# Patient Record
Sex: FEMALE | Race: WHITE | HISPANIC OR LATINO | URBAN - METROPOLITAN AREA
[De-identification: names, ages, dates, MRNs, and addresses within clinical notes are randomized per-mention and may not be internally consistent; named-entity substitution may affect disease eponyms.]

---

## 2020-01-20 ENCOUNTER — APPOINTMENT (RX ONLY)
Dept: URBAN - METROPOLITAN AREA CLINIC 349 | Facility: CLINIC | Age: 50
Setting detail: DERMATOLOGY
End: 2020-01-20

## 2020-01-20 DIAGNOSIS — L30.9 DERMATITIS, UNSPECIFIED: ICD-10-CM

## 2020-01-20 DIAGNOSIS — D485 NEOPLASM OF UNCERTAIN BEHAVIOR OF SKIN: ICD-10-CM

## 2020-01-20 PROBLEM — D48.5 NEOPLASM OF UNCERTAIN BEHAVIOR OF SKIN: Status: ACTIVE | Noted: 2020-01-20

## 2020-01-20 PROCEDURE — ? OTHER

## 2020-01-20 PROCEDURE — ? COUNSELING

## 2020-01-20 PROCEDURE — ? REFERRAL

## 2020-01-20 PROCEDURE — 99202 OFFICE O/P NEW SF 15 MIN: CPT

## 2020-01-20 ASSESSMENT — LOCATION SIMPLE DESCRIPTION DERM
LOCATION SIMPLE: CHEST
LOCATION SIMPLE: LEFT EYELID

## 2020-01-20 ASSESSMENT — LOCATION DETAILED DESCRIPTION DERM
LOCATION DETAILED: LEFT LATERAL CANTHUS
LOCATION DETAILED: MIDDLE STERNUM

## 2020-01-20 ASSESSMENT — LOCATION ZONE DERM
LOCATION ZONE: TRUNK
LOCATION ZONE: EYELID

## 2020-01-20 NOTE — HPI: RASH
How Severe Is Your Rash?: mild
Is This A New Presentation, Or A Follow-Up?: Rash
Additional History: Patient stated that she will get a blistering itchy rash on her chest right before summer. Patient stated that rash will appear and then last a few weeks and then go away. Patient stated this first started about five years ago.

## 2020-01-20 NOTE — HPI: SKIN LESION
How Severe Is Your Skin Lesion?: mild
Has Your Skin Lesion Been Treated?: not been treated
Is This A New Presentation, Or A Follow-Up?: Growth
Additional History: Patient has a lesion on the left eyelid. Patient stated it has been present for several years and has gotten larger over the years. Patient denies any bleeding or pain in the area.

## 2020-01-20 NOTE — PROCEDURE: OTHER
Other (Free Text): Lesion has been present to for over 15 years and has gotten larger over the years. Patient stated that lesion has been growing towards her eye and she can see it out of the corner of her eye. \\n\\Zane Ocampo came into office and examined lesion. Dr. Ocampo stated that she didn’t feel that it was anything concerning. They discussed draining lesion but mentioned that it would possibly fill back up. \\n\\nDiscussed referring patient to [facility] specialist for removal. Other (Free Text): Lesion has been present to for over 15 years and has gotten larger over the years. Patient stated that lesion has been growing towards her eye and she can see it out of the corner of her eye. \\n\\Zane Ocampo came into office and examined lesion. Dr. Ocampo stated that she didn’t feel that it was anything concerning. They discussed draining lesion but mentioned that it would possibly fill back up. \\n\\nDiscussed referring patient to [facility] specialist for removal.

## 2020-01-20 NOTE — PROCEDURE: OTHER
Other (Free Text): Patient stated that she will get a blistering itchy rash on her chest right before summer. Patient stated that rash will appear and then last a few weeks and then go away. Patient stated this first started about five years ago. \\n\\nRash not present today. Patient advised to contact office when rash flares up, we will bring her in and biopsy rash when it is flared.

## 2024-08-09 ENCOUNTER — APPOINTMENT (OUTPATIENT)
Dept: GENERAL RADIOLOGY | Age: 54
End: 2024-08-09
Payer: COMMERCIAL

## 2024-08-09 ENCOUNTER — HOSPITAL ENCOUNTER (EMERGENCY)
Age: 54
Discharge: HOME | End: 2024-08-09
Attending: EMERGENCY MEDICINE
Payer: COMMERCIAL

## 2024-08-09 VITALS
BODY MASS INDEX: 30.4 KG/M2 | DIASTOLIC BLOOD PRESSURE: 97 MMHG | SYSTOLIC BLOOD PRESSURE: 114 MMHG | TEMPERATURE: 98.2 F | HEIGHT: 61 IN | HEART RATE: 95 BPM | OXYGEN SATURATION: 97 % | WEIGHT: 161 LBS | RESPIRATION RATE: 16 BRPM

## 2024-08-09 DIAGNOSIS — M75.81 TENDINITIS OF RIGHT ROTATOR CUFF: Primary | ICD-10-CM

## 2024-08-09 PROCEDURE — 99283 EMERGENCY DEPT VISIT LOW MDM: CPT

## 2024-08-09 PROCEDURE — 73030 X-RAY EXAM OF SHOULDER: CPT

## 2024-08-09 RX ORDER — TRAMADOL HYDROCHLORIDE 50 MG/1
50-100 TABLET ORAL EVERY 8 HOURS PRN
Qty: 12 TABLET | Refills: 0 | Status: SHIPPED | OUTPATIENT
Start: 2024-08-09 | End: 2024-08-12

## 2024-08-09 RX ORDER — IBUPROFEN 800 MG/1
800 TABLET ORAL EVERY 8 HOURS PRN
Qty: 21 TABLET | Refills: 0 | Status: SHIPPED | OUTPATIENT
Start: 2024-08-09

## 2024-08-09 RX ORDER — PREDNISONE 20 MG/1
60 TABLET ORAL DAILY
Qty: 15 TABLET | Refills: 0 | Status: SHIPPED | OUTPATIENT
Start: 2024-08-09 | End: 2024-08-14

## 2024-08-09 RX ORDER — TIRZEPATIDE 12.5 MG/.5ML
12.5 INJECTION, SOLUTION SUBCUTANEOUS
COMMUNITY

## 2024-08-09 ASSESSMENT — PAIN DESCRIPTION - ORIENTATION: ORIENTATION: RIGHT

## 2024-08-09 ASSESSMENT — PAIN DESCRIPTION - LOCATION: LOCATION: SHOULDER

## 2024-08-09 ASSESSMENT — PAIN - FUNCTIONAL ASSESSMENT: PAIN_FUNCTIONAL_ASSESSMENT: 0-10

## 2024-08-09 ASSESSMENT — PAIN SCALES - GENERAL: PAINLEVEL_OUTOF10: 8

## 2024-08-09 NOTE — ED TRIAGE NOTES
Pt ambulatory to triage with steady gait. Pt reports right shoulder pain that started last week and left neck/shoulder pain that started today. Pt denies taking anything for pain. Pt states she works out in the gym and could have lifted heavy weights.

## 2024-08-09 NOTE — ED NOTES
Patient mobility status  with no difficulty. Provider aware     I have reviewed discharge instructions with the patient.  The patient verbalized understanding.    Patient left ED via Discharge Method: ambulatory to Home.    Opportunity for questions and clarification provided.     Patient given 3 scripts.

## 2024-08-09 NOTE — DISCHARGE INSTRUCTIONS
Wear sling for comfort for a few days.  Remove sling to do some range of motion exercises several times a day, as demonstrated

## 2024-08-15 ENCOUNTER — OFFICE VISIT (OUTPATIENT)
Dept: ORTHOPEDIC SURGERY | Age: 54
End: 2024-08-15

## 2024-08-15 DIAGNOSIS — M25.811 SHOULDER IMPINGEMENT, RIGHT: Primary | ICD-10-CM

## 2024-08-15 RX ORDER — METHYLPREDNISOLONE ACETATE 80 MG/ML
80 INJECTION, SUSPENSION INTRA-ARTICULAR; INTRALESIONAL; INTRAMUSCULAR; SOFT TISSUE ONCE
Status: COMPLETED | OUTPATIENT
Start: 2024-08-15 | End: 2024-08-15

## 2024-08-15 RX ADMIN — METHYLPREDNISOLONE ACETATE 80 MG: 80 INJECTION, SUSPENSION INTRA-ARTICULAR; INTRALESIONAL; INTRAMUSCULAR; SOFT TISSUE at 08:40

## 2024-08-15 NOTE — PROGRESS NOTES
Name: Samra Reilly  YOB: 1970  Gender: female  MRN: [id number]  Date of Encounter:  8/15/2024       CHIEF COMPLAINT:     Chief Complaint   Patient presents with    Shoulder Pain     Right        SUBJECTIVE/OBJECTIVE:      HPI:    Samra Reilly  is a 54 y.o. pleasant female who presents today for a new evaluation of her right shoulder pain.    Ms. Cabrales reports a few months of progressing right shoulder pain that started after she began a new workout program at the gym.  She has pain primarily at the anterior shoulder but has radiation of pain down her arm and up into her neck at times.  She has a history of carpal tunnel syndrome and had recent CTS injections but this did not change her shoulder pain.  She is right-hand dominant.  She reports a history of being told she has a rotator cuff tear but believes that was in her left shoulder.  At the time she had a cortisone injection that gave her good relief and she did physical therapy.     PAST HISTORY:   Past medical, surgical, family, social history and allergies reviewed by me.   Pertinent history:   Tobacco use:  reports that she has never smoked. She has never used smokeless tobacco.  Diabetes: none  No results found for: \"LABA1C\"  Anticoagulation: no      REVIEW OF SYSTEMS:   As noted in HPI.     PHYSICAL EXAMINATION:     Gen: Well-developed, no acute distress   HEENT: NC/AT, EOMI   Neck: Trachea midline, normal ROM   CV: Regular rhythm by palpation of distal pulse, normal capillary refill   Pulm: No respiratory distress, no stridor   Psychiatric: Well oriented, normal mood and affect.   Skin: No rashes, lesions or ulcers, normal temperature, turgor, and texture on uninvolved extremity.      ORTHO EXAM:    Right Shoulder:     Inspection: No deformity, No erythema  ROM active  passive:  with pain. Abduction 170 with pain.  Ex rotation 80. Int rotation: 60 .  Tenderness: Tenderness to infraspinatus beneath scapular spine, as well as

## 2024-09-02 NOTE — ED PROVIDER NOTES
Emergency Department Provider Note       PCP: File, Not On, MD   Age: 54 y.o.   Sex: female     DISPOSITION Decision To Discharge 08/09/2024 10:03:45 AM  Condition at Disposition: Good       ICD-10-CM    1. Tendinitis of right rotator cuff  M75.81 traMADol (ULTRAM) 50 MG tablet     [facility]          Medical Decision Making     Atraumatic shoulder pain negative x-rays, suspect rotator cuff impingement.  Will start steroids NSAIDs muscle relaxer, follow-up with orthopedics     1 acute, uncomplicated illness or injury.  Over the counter drug management performed.  Prescription drug management performed.  Patient was discharged risks and benefits of hospitalization were considered.  Shared medical decision making was utilized in creating the patients health plan today.    I independently ordered and reviewed each unique test.  I reviewed external records: provider visit note from outside specialist.     I interpreted the X-rays right shoulder x-ray negative for fracture or dislocation.              History     54-year-old female with anterior right shoulder pain.  Going for a week worsening now spreading to her neck.  Patient suspects she may have strained something when working out at the gym.  There is no fall or impact.  No numbness or weakness down the arm to suggest radicular disc problems pain worsens with movement and she has limited range of motion.        ROS     Review of Systems   Musculoskeletal:  Positive for arthralgias. Negative for neck pain.   Neurological:  Negative for weakness and numbness.   All other systems reviewed and are negative.       Physical Exam     Vitals signs and nursing note reviewed:  Vitals:    08/09/24 0854   BP: (!) 114/97   Pulse: 95   Resp: 16   Temp: 98.2 °F (36.8 °C)   TempSrc: Oral   SpO2: 97%   Weight: 73 kg (161 lb)   Height: 1.549 m (5' 1\")      Physical Exam  Vitals and nursing note reviewed.   Constitutional:       General: She is not in